# Patient Record
Sex: FEMALE | Race: OTHER | HISPANIC OR LATINO | URBAN - METROPOLITAN AREA
[De-identification: names, ages, dates, MRNs, and addresses within clinical notes are randomized per-mention and may not be internally consistent; named-entity substitution may affect disease eponyms.]

---

## 2019-07-16 ENCOUNTER — EMERGENCY (EMERGENCY)
Facility: HOSPITAL | Age: 49
LOS: 1 days | Discharge: HOME | End: 2019-07-16
Attending: EMERGENCY MEDICINE | Admitting: EMERGENCY MEDICINE
Payer: SELF-PAY

## 2019-07-16 VITALS
OXYGEN SATURATION: 98 % | TEMPERATURE: 97 F | RESPIRATION RATE: 16 BRPM | DIASTOLIC BLOOD PRESSURE: 76 MMHG | HEART RATE: 66 BPM | SYSTOLIC BLOOD PRESSURE: 146 MMHG

## 2019-07-16 VITALS
RESPIRATION RATE: 16 BRPM | WEIGHT: 139.99 LBS | HEART RATE: 67 BPM | TEMPERATURE: 98 F | DIASTOLIC BLOOD PRESSURE: 85 MMHG | OXYGEN SATURATION: 97 % | SYSTOLIC BLOOD PRESSURE: 134 MMHG | HEIGHT: 63 IN

## 2019-07-16 PROCEDURE — 72100 X-RAY EXAM L-S SPINE 2/3 VWS: CPT

## 2019-07-16 PROCEDURE — 29515 APPLICATION SHORT LEG SPLINT: CPT | Mod: RT

## 2019-07-16 PROCEDURE — 73610 X-RAY EXAM OF ANKLE: CPT | Mod: 26,RT

## 2019-07-16 PROCEDURE — 72100 X-RAY EXAM L-S SPINE 2/3 VWS: CPT | Mod: 26

## 2019-07-16 PROCEDURE — 99284 EMERGENCY DEPT VISIT MOD MDM: CPT

## 2019-07-16 PROCEDURE — 99284 EMERGENCY DEPT VISIT MOD MDM: CPT | Mod: 25

## 2019-07-16 PROCEDURE — 73630 X-RAY EXAM OF FOOT: CPT | Mod: 26,RT

## 2019-07-16 PROCEDURE — 29515 APPLICATION SHORT LEG SPLINT: CPT

## 2019-07-16 PROCEDURE — 73610 X-RAY EXAM OF ANKLE: CPT

## 2019-07-16 PROCEDURE — 73630 X-RAY EXAM OF FOOT: CPT

## 2019-07-16 RX ORDER — FEXOFENADINE HCL 30 MG
1 TABLET ORAL
Qty: 0 | Refills: 0 | DISCHARGE

## 2019-07-16 RX ORDER — IBUPROFEN 200 MG
600 TABLET ORAL ONCE
Refills: 0 | Status: COMPLETED | OUTPATIENT
Start: 2019-07-16 | End: 2019-07-16

## 2019-07-16 RX ADMIN — Medication 600 MILLIGRAM(S): at 10:50

## 2019-07-16 RX ADMIN — Medication 600 MILLIGRAM(S): at 11:21

## 2019-07-16 NOTE — ED PROVIDER NOTE - PROGRESS NOTE DETAILS
Pt examined by ED attending, Dr. Khan who agreed with disposition and plan. R foot placed in posterior splint, crutches given, advised NWB, f/u podiatry.   Reevaluated patient at bedside.  Patient feeling much improved.  Discussed the results of all diagnostic testing in ED and copies of all reports given.   An opportunity to ask questions was given.  Discussed the importance of prompt, close medical follow-up.  Patient will return with any changes, concerns or persistent / worsening symptoms.  Understanding of all instructions verbalized.

## 2019-07-16 NOTE — ED ADULT NURSE NOTE - INV PAIN INTERVENTIONS-NUMBER SCALE
cold application/single medication modality/positioned to decrease pressure/unnecessary movement avoided

## 2019-07-16 NOTE — ED PROVIDER NOTE - ATTENDING CONTRIBUTION TO CARE
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 CO pain in rt ankle and rt lower back.   Denies head or neck injury. PE minor swelling and tenderness rt lat ankle. Rt lower back mildly tender, nondeformed. FROM pulses and sensation intact. No neuro deficits. Plan - Xrays, ice pack, motrin, wrap or splint as needed, ortho follow up.    I performed a history and physical exam of the patient and discussed their management with the advanced care provider. I reviewed the advanced care provider's note and agree with the documented findings and plan of care. My medical decision making and objective findings are found above.

## 2019-07-16 NOTE — ED PROVIDER NOTE - OBJECTIVE STATEMENT
47 y/o F biba with c/o R ankle and R lower back pain sp fall x 30 mins. Pt states that she was coming down a small ladder at work, missed the last step and twisted her R ankle on the floor and fell to her R side. Pt c/o R ankle and R lower back pain. Denies head trauma, LOC, open wounds, numbness, tingling, hematuria, abd pain, n/v or other injuries/symptoms.

## 2019-07-16 NOTE — ED ADULT NURSE NOTE - OBJECTIVE STATEMENT
tripped and fell at work pain and swelling right lateral malleolus strong dp pulses b/l warm and dry distally

## 2019-07-16 NOTE — ED PROVIDER NOTE - CARE PLAN
Principal Discharge DX:	Ankle injury, right, initial encounter  Secondary Diagnosis:	Back pain  Secondary Diagnosis:	Fall Principal Discharge DX:	Foot fracture, right, closed, initial encounter  Goal:	5th metacarpal fracture  Secondary Diagnosis:	Back pain  Secondary Diagnosis:	Fall

## 2019-07-16 NOTE — ED PROVIDER NOTE - CLINICAL SUMMARY MEDICAL DECISION MAKING FREE TEXT BOX
47 yo F sp missed one last step of a small ladder at work and twisted R ankle on the ground and fell on her R side biba for eval, +ttp with mild swelling noted to lateral R malleolus and mild ttp R paraspinal lower lumbar spine, no CVAT, NVI, will get xrays, motrin, ice, re-assess 47 yo F sp missed one last step of a small ladder at work and twisted R ankle on the ground and fell on her R side biba for eval, +ttp with mild swelling noted to lateral R malleolus and mild ttp R paraspinal lower lumbar spine, no CVAT, NVI, will get xrays, motrin, ice, re-assess, splint, f/u ortho

## 2019-07-16 NOTE — ED PROVIDER NOTE - NS CPE EDP MUSC LUMBAR LOC
+ttp R lower paraspinal lumbar spine, no midline tenderness, FROM, no stepoffs/ecchymosis/deformities noted/tenderness

## 2019-07-16 NOTE — ED PROVIDER NOTE - LOWER EXTREMITY EXAM, RIGHT
+ttp and swelling noted to R lateral malleolus, +mild ttp medial malleolus, ROM intact ankle, achilles NT and intact, skin intact, +mild 5th MT tenderness, toes warm & mobile, cap refill<2sec, pulses and sensation intact, NVI/SWELLING/TENDERNESS

## 2020-10-23 NOTE — ED ADULT NURSE NOTE - MUSCULOSKELETAL ASSESSMENT
Alert and oriented x3, disoriented to time. Offered fluids and snacks. Safety precautions in placed. Bed in lowest position. Upper side rails up. Treaded socks on. Reinforced the use of call light when needing assistance.   - - -
